# Patient Record
Sex: FEMALE | Race: ASIAN | NOT HISPANIC OR LATINO | Employment: PART TIME | URBAN - METROPOLITAN AREA
[De-identification: names, ages, dates, MRNs, and addresses within clinical notes are randomized per-mention and may not be internally consistent; named-entity substitution may affect disease eponyms.]

---
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
## 2024-09-04 ENCOUNTER — OFFICE VISIT (OUTPATIENT)
Dept: URGENT CARE | Facility: URGENT CARE | Age: 19
End: 2024-09-04
Payer: COMMERCIAL

## 2024-09-04 VITALS
SYSTOLIC BLOOD PRESSURE: 132 MMHG | TEMPERATURE: 98.9 F | HEART RATE: 96 BPM | OXYGEN SATURATION: 99 % | DIASTOLIC BLOOD PRESSURE: 85 MMHG | WEIGHT: 227 LBS | RESPIRATION RATE: 14 BRPM

## 2024-09-04 DIAGNOSIS — R07.0 THROAT PAIN: ICD-10-CM

## 2024-09-04 DIAGNOSIS — J02.0 STREP THROAT: Primary | ICD-10-CM

## 2024-09-04 LAB — DEPRECATED S PYO AG THROAT QL EIA: POSITIVE

## 2024-09-04 PROCEDURE — 87880 STREP A ASSAY W/OPTIC: CPT | Performed by: NURSE PRACTITIONER

## 2024-09-04 PROCEDURE — 99203 OFFICE O/P NEW LOW 30 MIN: CPT | Performed by: NURSE PRACTITIONER

## 2024-09-04 RX ORDER — AMOXICILLIN 500 MG/1
500 CAPSULE ORAL 2 TIMES DAILY
Qty: 20 CAPSULE | Refills: 0 | Status: SHIPPED | OUTPATIENT
Start: 2024-09-04 | End: 2024-09-14

## 2024-09-04 NOTE — PATIENT INSTRUCTIONS
1) Increase rest and fluid intake.  2) Give Tylenol as needed for fever.   3) Strep infection is considered contagious until treated for 24 hours, avoid attending  or work during contagious period.  4) Complete full course of antibiotics.   5) Replace toothbrush after being on the antibiotic for 48 hours to avoid reinfection   6) Return if not resolved in one week or sooner if worsening.

## 2024-09-04 NOTE — PROGRESS NOTES
ASSESSMENT:  1. Strep throat    - amoxicillin (AMOXIL) 500 MG capsule; Take 1 capsule (500 mg) by mouth 2 times daily for 10 days.  Dispense: 20 capsule; Refill: 0    2. Throat pain    - Streptococcus A Rapid Screen w/Reflex to PCR      PLAN:   1) Increase rest and fluid intake.  2) Give Tylenol as needed for fever.   3) Strep infection is considered contagious until treated for 24 hours, avoid attending  or work during contagious period.  4) Complete full course of antibiotics.   5) Replace toothbrush after being on the antibiotic for 48 hours to avoid reinfection   6) Return if not resolved in one week or sooner if worsening.    SUBJECTIVE:  Idalia Hansen is a 19 year old female with a chief complaint of sore throat.  Onset of symptoms was 3 day(s) ago.    Course of illness: sudden onset.  Severity mild  Current and Associated symptoms: fever, cough - non-productive, and headache  Treatment measures tried include Tylenol/Ibuprofen.  Predisposing factors include ill contact: Work; pt works at .    No past medical history on file.  Current Outpatient Medications   Medication Sig Dispense Refill    amoxicillin-clavulanate (AUGMENTIN) 875-125 MG per tablet Take 1 tablet by mouth 2 times daily (Patient not taking: Reported on 9/4/2024) 20 tablet 0     Social History     Tobacco Use    Smoking status: Never    Smokeless tobacco: Never   Substance Use Topics    Alcohol use: Not on file           OBJECTIVE:   /85   Pulse 96   Temp 98.9  F (37.2  C) (Tympanic)   Resp 14   Wt 103 kg (227 lb)   LMP 08/01/2024   SpO2 99%   NO DYSPHONIA  GENERAL APPEARANCE: healthy, alert and no distress  EYES: EOMI,  PERRL, conjunctiva clear  HENT: ear canals and TM's normal.  Nose normal.  Pharynx erythematous with some exudate noted.  NECK: supple, non-tender to palpation, no adenopathy noted  RESP: lungs clear to auscultation - no rales, rhonchi or wheezes  CV: regular rates and rhythm, normal S1 S2, no murmur  noted  SKIN: no suspicious lesions or rashes    Rapid Strep test is positive

## 2024-12-11 ENCOUNTER — OFFICE VISIT (OUTPATIENT)
Dept: FAMILY MEDICINE | Facility: CLINIC | Age: 19
End: 2024-12-11
Payer: COMMERCIAL

## 2024-12-11 VITALS
BODY MASS INDEX: 39.95 KG/M2 | WEIGHT: 234 LBS | SYSTOLIC BLOOD PRESSURE: 124 MMHG | HEIGHT: 64 IN | OXYGEN SATURATION: 98 % | DIASTOLIC BLOOD PRESSURE: 78 MMHG | TEMPERATURE: 98.7 F | HEART RATE: 92 BPM | RESPIRATION RATE: 16 BRPM

## 2024-12-11 DIAGNOSIS — Z11.4 SCREENING FOR HIV (HUMAN IMMUNODEFICIENCY VIRUS): Primary | ICD-10-CM

## 2024-12-11 DIAGNOSIS — Z11.59 NEED FOR HEPATITIS C SCREENING TEST: ICD-10-CM

## 2024-12-11 PROCEDURE — 99213 OFFICE O/P EST LOW 20 MIN: CPT | Performed by: FAMILY MEDICINE

## 2024-12-11 ASSESSMENT — PAIN SCALES - GENERAL: PAINLEVEL_OUTOF10: NO PAIN (0)

## 2024-12-11 ASSESSMENT — PATIENT HEALTH QUESTIONNAIRE - PHQ9
SUM OF ALL RESPONSES TO PHQ QUESTIONS 1-9: 12
SUM OF ALL RESPONSES TO PHQ QUESTIONS 1-9: 12
10. IF YOU CHECKED OFF ANY PROBLEMS, HOW DIFFICULT HAVE THESE PROBLEMS MADE IT FOR YOU TO DO YOUR WORK, TAKE CARE OF THINGS AT HOME, OR GET ALONG WITH OTHER PEOPLE: VERY DIFFICULT

## 2024-12-11 NOTE — PROGRESS NOTES
"S :Idalia Hansen is a 19 year old female with a week of some intermittent L ear pain.      No drainage.  Some pain when swallowing.    No hearing issues    O:/78   Pulse 92   Temp 98.7  F (37.1  C) (Tympanic)   Resp 16   Ht 1.613 m (5' 3.5\")   Wt 106.1 kg (234 lb)   SpO2 98%   BMI 40.80 kg/m    GEN: Alert and oriented, in no acute distress  R ear normal  L ear normal  ENT: oropharynx clear, no exudate or palate/tonsil asymmetry  Neck: neck supple without mass or lymphadenopathy    A: ear pain, nos    P: likely viral, URI picture.  No evidence of ear issues on exam. Follow-up prn, use ibuprofen, etc.    "

## 2025-07-07 ENCOUNTER — TELEPHONE (OUTPATIENT)
Dept: FAMILY MEDICINE | Facility: CLINIC | Age: 20
End: 2025-07-07
Payer: COMMERCIAL

## 2025-07-07 NOTE — LETTER
July 7, 2025    Idalia Hansen  [id number]   [facility] [address]    Hello,     Your care team at [facility]  values your health and well-being. After reviewing your chart, we have identified recommendation(s) to help you better manage your health.    It's time for your Annual Wellness visit. During your visit, we'll discuss your health, well-being, and any questions you may have related to preventive care. We'll also review any recommended tests, exams, or screenings you might need. To schedule please call your clinic [phone] or use your [facility] account.     If you recently had or are having any of these services soon, please contact the clinic via phone or [facility] so that your care team can update your records.    We look forward to seeing you at your upcoming visit.    If you have any questions or concerns, please contact our clinic. Thank you for continuing to trust us with your care.    Sincerely,    Your [facility] Care Team

## 2025-07-07 NOTE — TELEPHONE ENCOUNTER
Patient Quality Outreach    Patient is due for the following:   Cervical Cancer Screening - PAP Needed  Physical Preventive Adult Physical    Action(s) Taken:   Schedule a Adult Preventative    Type of outreach:    Sent letter.    Questions for provider review:    None         Katty Calvert MA  Chart routed to None.